# Patient Record
Sex: FEMALE | URBAN - METROPOLITAN AREA
[De-identification: names, ages, dates, MRNs, and addresses within clinical notes are randomized per-mention and may not be internally consistent; named-entity substitution may affect disease eponyms.]

---

## 2022-11-15 ENCOUNTER — HOSPITAL ENCOUNTER (OUTPATIENT)
Dept: PHYSICAL THERAPY | Age: 47
Discharge: HOME | End: 2022-11-15
Payer: OTHER GOVERNMENT

## 2022-11-15 PROCEDURE — 97140 MANUAL THERAPY 1/> REGIONS: CPT

## 2022-11-15 PROCEDURE — 97162 PT EVAL MOD COMPLEX 30 MIN: CPT

## 2022-11-15 PROCEDURE — 97110 THERAPEUTIC EXERCISES: CPT

## 2022-11-15 PROCEDURE — 97535 SELF CARE MNGMENT TRAINING: CPT
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
## 2022-11-21 ENCOUNTER — HOSPITAL ENCOUNTER (OUTPATIENT)
Dept: PHYSICAL THERAPY | Age: 47
Discharge: HOME | End: 2022-11-21
Payer: OTHER GOVERNMENT

## 2022-11-21 PROCEDURE — 97140 MANUAL THERAPY 1/> REGIONS: CPT

## 2022-11-21 PROCEDURE — 97110 THERAPEUTIC EXERCISES: CPT

## 2022-11-21 PROCEDURE — 97112 NEUROMUSCULAR REEDUCATION: CPT
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
## 2022-11-23 ENCOUNTER — HOSPITAL ENCOUNTER (OUTPATIENT)
Dept: PHYSICAL THERAPY | Age: 47
Discharge: HOME | End: 2022-11-23
Payer: OTHER GOVERNMENT

## 2022-11-23 PROCEDURE — 97140 MANUAL THERAPY 1/> REGIONS: CPT

## 2022-11-23 PROCEDURE — 97110 THERAPEUTIC EXERCISES: CPT

## 2022-11-23 PROCEDURE — 97112 NEUROMUSCULAR REEDUCATION: CPT

## 2022-11-23 NOTE — PROGRESS NOTES
PT DAILY TREATMENT NOTE     Patient Name: Genaro Ryan  Date:2022  : 1975  [x]  Patient  Verified  Payor:  / Plan: Kaylynn Mckeon / Product Type: Francie Enriquezhmann /    In time:1033  Out time:1125  Total Treatment Time (min): 52  Visit #: 3 of 12      Treatment Area: Cervicalgia [M54.2]    SUBJECTIVE  Pain Level (0-10 scale): 6  Any medication changes, allergies to medications, adverse drug reactions, diagnosis change, or new procedure performed?: [x] No    [] Yes (see summary sheet for update)  Subjective functional status/changes:   [] No changes reported  Patient denies atypical soreness post last session. OBJECTIVE    Modality rationale: decrease pain and increase tissue extensibility to improve the patients ability to tolerate ADLs.     Min Type Additional Details    [] Estim:  []Unatt       []IFC  []Premod                        []Other:  []w/ice   []w/heat  Position:  Location:    [] Estim: []Att    []TENS instruct  []NMES                    []Other:  []w/US   []w/ice   []w/heat  Position:  Location:    []  Traction: [] Cervical       []Lumbar                       [] Prone          []Supine                       []Intermittent   []Continuous Lbs:  [] before manual  [] after manual    []  Ultrasound: []Continuous   [] Pulsed                           []1MHz   []3MHz W/cm2:  Location:    []  Iontophoresis with dexamethasone         Location: [] Take home patch   [] In clinic   10 []  Ice     [x]  heat  []  Ice massage  []  Laser   []  Anodyne Position: supine   Location: neck    []  Laser with stim  []  Other:  Position:  Location:    []  Vasopneumatic Device    []  Right     []  Left  Pre-treatment girth:  Post-treatment girth:  Measured at (location):  Pressure:       [] lo [] med [] hi   Temperature: [] lo [] med [] hi   [x] Skin assessment post-treatment:  [x]intact [x]redness- no adverse reaction    []redness - adverse reaction:         11 min Therapeutic Exercise:  [x] See flow sheet :   Rationale: increase ROM and increase strength to improve the patients ability to complete ADLs with ease. 23 min Neuromuscular Re-education:  [x]  See flow sheet : cervical and scapular re-education, desensitization   Rationale: increase ROM, increase strength, improve coordination, and increase proprioception  to improve the patients ability to improve biomechanics and increase kinesthetic awareness for ease of ADL completion. 8 min Manual Therapy:  supine:  STM to B c/s paraspinals, UT/LS and scalenes, Gentle PA and right lateral mobs, SOR   The manual therapy interventions were performed at a separate and distinct time from the therapeutic activities interventions. Rationale: decrease pain, increase ROM, increase tissue extensibility, and decrease trigger points to improve functional mobility. With   [] TE   [] TA   [] neuro   [] other: Patient Education: [x] Review HEP    [] Progressed/Changed HEP based on:   [] positioning   [] body mechanics   [] transfers   [] heat/ice application    [] other:      Other Objective/Functional Measures:   -initiated rows/extensions, wall pushups   -patient required brief rest period during exercises due to reports of dizziness, after which she was able to complete remaining exercises. Pain Level (0-10 scale) post treatment: 4    ASSESSMENT/Changes in Function: Today's session focused on increased cervical and scapular strength, mobility, and kinesthetic awareness. Patient tolerated treatment well but required a brief rest period due to dizziness midway through session; however, she was able to complete remaining exercises. Increased TTP noted during manual, most notably at right cervical spine and left UT/LS. Continue to progress POC according to patient tolerance.      Patient will continue to benefit from skilled PT services to modify and progress therapeutic interventions, address functional mobility deficits, address ROM deficits, address strength deficits, analyze and address soft tissue restrictions, analyze and cue movement patterns, analyze and modify body mechanics/ergonomics, assess and modify postural abnormalities, and instruct in home and community integration to attain remaining goals. []  See Plan of Care  []  See progress note/recertification  []  See Discharge Summary         Progress towards goals / Updated goals:  Short Term Goals: To be accomplished in 2 weeks:  Pt will report daily HEP compliance to maximize therapeutic benefits. Eval: HEP assigned  Current: Pt report compliance, MET 11/21/2022  Pt will improve her B cervical sidebend to 25 deg to improve ease of self care. Eval: 15 deg with right sided neck pain  Long Term Goals: To be accomplished in 6 weeks:  Pt will improve her B c/s rotation to 65 deg to improve ease of turning to look behind her. Eval: 40 deg B with pain  Pt will improve her c/s flexion and extension to 40 deg or better to improve ease of looking down to the floor or up to the ceiling. Eval: 19 deg each with pain  Pt will report 75% improvement in headaches to improve ease of managing ADLs. Eval: daily unremitting MOLINA  Pt will demonstrate B shoulder strength of 4+/5 MMT without pain increase to improve ease of performing house chores. Eval: 4/5  Pt will improve her FOTO to 60, demonstrating improved functional ADL capacity.   Eval: 36       PLAN  []  Upgrade activities as tolerated     []  Continue plan of care  []  Update interventions per flow sheet       []  Discharge due to:_  []  Other:_      Nasir Huffman, PTA 11/23/2022  7:22 AM    Future Appointments   Date Time Provider Radha Vilchis   11/23/2022 10:45 AM Lucio Sanchez PTA [facility]   11/29/2022 11:00 AM [facility] NURSE [facility]   11/30/2022 12:15 PM Tanya Jeffers PTA [facility]   12/2/2022 10:45 AM Lucio Sanchez PTA [facility]   12/7/2022 12:00 [facility]   12/9/2022 10:30 AM [facility]   12/12/2022 10:30 AM [facility]

## 2022-11-29 ENCOUNTER — CLINICAL SUPPORT (OUTPATIENT)
Dept: GASTROENTEROLOGY | Age: 47
End: 2022-11-29

## 2022-11-29 DIAGNOSIS — Z12.11 ENCOUNTER FOR SCREENING COLONOSCOPY: Primary | ICD-10-CM

## 2022-11-29 RX ORDER — CONJUGATED ESTROGENS 0.62 MG/G
CREAM VAGINAL
COMMUNITY
Start: 2022-11-09 | End: 2023-11-09

## 2022-11-29 RX ORDER — IBUPROFEN 800 MG/1
TABLET ORAL
COMMUNITY
Start: 2022-10-26

## 2022-11-29 RX ORDER — MELOXICAM 7.5 MG/1
7.5 TABLET ORAL DAILY
COMMUNITY

## 2022-11-29 RX ORDER — POLYETHYLENE GLYCOL 3350, SODIUM SULFATE ANHYDROUS, SODIUM BICARBONATE, SODIUM CHLORIDE, POTASSIUM CHLORIDE 236; 22.74; 6.74; 5.86; 2.97 G/4L; G/4L; G/4L; G/4L; G/4L
4 POWDER, FOR SOLUTION ORAL
Qty: 4000 ML | Refills: 0 | Status: SHIPPED | OUTPATIENT
Start: 2022-11-29 | End: 2022-11-29

## 2022-11-30 ENCOUNTER — HOSPITAL ENCOUNTER (OUTPATIENT)
Dept: PHYSICAL THERAPY | Age: 47
Discharge: HOME | End: 2022-11-30
Payer: OTHER GOVERNMENT

## 2022-11-30 PROCEDURE — 97112 NEUROMUSCULAR REEDUCATION: CPT

## 2022-11-30 PROCEDURE — 97140 MANUAL THERAPY 1/> REGIONS: CPT

## 2022-11-30 PROCEDURE — 97110 THERAPEUTIC EXERCISES: CPT

## 2022-11-30 NOTE — PROGRESS NOTES
PT DAILY TREATMENT NOTE     Patient Name: Florencio Vale  Date:2022  : 1975  [x]  Patient  Verified  Payor:  / Plan: Stefan Holliday 75 / Product Type:  /    In time:12:05  Out time:1:03  Total Treatment Time (min): 58  Visit #: 4 of 12    Treatment Area: Cervicalgia [M54.2]    SUBJECTIVE  Pain Level (0-10 scale): 6  Any medication changes, allergies to medications, adverse drug reactions, diagnosis change, or new procedure performed?: [x] No    [] Yes (see summary sheet for update)  Subjective functional status/changes:   [] No changes reported  Pt states pain is on both sides of neck now.  She saw MD and has an appt to be screened for concussion on 22    OBJECTIVE    Modality rationale: decrease pain to improve the patients ability to perform daily tasks   Min Type Additional Details    [] Estim:  []Unatt       []IFC  []Premod                        []Other:  []w/ice   []w/heat  Position:  Location:    [] Estim: []Att    []TENS instruct  []NMES                    []Other:  []w/US   []w/ice   []w/heat  Position:  Location:    []  Traction: [] Cervical       []Lumbar                       [] Prone          []Supine                       []Intermittent   []Continuous Lbs:  [] before manual  [] after manual    []  Ultrasound: []Continuous   [] Pulsed                           []1MHz   []3MHz W/cm2:  Location:    []  Iontophoresis with dexamethasone         Location: [] Take home patch   [] In clinic   10 []  Ice     [x]  heat  []  Ice massage  []  Laser   []  Anodyne Position: supine with wedge  Location: neck    []  Laser with stim  []  Other:  Position:  Location:    []  Vasopneumatic Device    []  Right     []  Left  Pre-treatment girth:  Post-treatment girth:  Measured at (location):  Pressure:       [] lo [] med [] hi   Temperature: [] lo [] med [] hi   [] Skin assessment post-treatment:  []intact []redness- no adverse reaction    []redness - adverse reaction:     26 min Therapeutic Exercise:  [x] See flow sheet :   Rationale: increase ROM and increase strength to improve the patients ability to Perform ADLs    10 min Neuromuscular Re-education:  [x]  See flow sheet :   Rationale: increase strength, improve coordination, and increase proprioception  to improve the patients ability to perform functional tasks    12 min Manual Therapy: In supine, gentle manual c/s traction, SOR, STM to carlin c/s paraspinals, UT/LS and scalenes, Gentle PA and right lateral mobs   The manual therapy interventions were performed at a separate and distinct time from the therapeutic activities interventions. Rationale: decrease pain, increase ROM, and increase tissue extensibility to improve functional mobility            With   [] TE   [] TA   [] neuro   [] other: Patient Education: [x] Review HEP    [] Progressed/Changed HEP based on:   [] positioning   [] body mechanics   [] transfers   [] heat/ice application    [] other:      Other Objective/Functional Measures:   C/s SB to right 30*, to left 38*     Pain Level (0-10 scale) post treatment: 4    ASSESSMENT/Changes in Function: Pt demo's improved carlin c/s SB and has met STG#2. Cont's to report ms tension and frequent HA. Cont'd significant ms tension @ carlin LS and right scalenes. Good tolerance to SNAGs with towel and no c/o dizziness during treatment today. Patient will continue to benefit from skilled PT services to modify and progress therapeutic interventions, address functional mobility deficits, address ROM deficits, address strength deficits, analyze and address soft tissue restrictions, analyze and cue movement patterns, analyze and modify body mechanics/ergonomics, and assess and modify postural abnormalities to attain remaining goals. []  See Plan of Care  []  See progress note/recertification  []  See Discharge Summary         Progress towards goals / Updated goals:  Short Term Goals:  To be accomplished in 2 weeks:  Pt will report daily HEP compliance to maximize therapeutic benefits. Eval: HEP assigned  Current: Pt report compliance, MET 11/21/2022  Pt will improve her B cervical sidebend to 25 deg to improve ease of self care. Eval: 15 deg with right sided neck pain  Current:Met, C/s SB to right 30*, to left 38* 11/30/2022  Long Term Goals: To be accomplished in 6 weeks:  Pt will improve her B c/s rotation to 65 deg to improve ease of turning to look behind her. Eval: 40 deg B with pain  Pt will improve her c/s flexion and extension to 40 deg or better to improve ease of looking down to the floor or up to the ceiling. Eval: 19 deg each with pain  Pt will report 75% improvement in headaches to improve ease of managing ADLs. Eval: daily unremitting MOLINA  Pt will demonstrate B shoulder strength of 4+/5 MMT without pain increase to improve ease of performing house chores. Eval: 4/5  Pt will improve her FOTO to 60, demonstrating improved functional ADL capacity.   Eval: 36    PLAN  []  Upgrade activities as tolerated     [x]  Continue plan of care  []  Update interventions per flow sheet       []  Discharge due to:_  []  Other:_      Michael Jeffers, PTA 11/30/2022  12:04 PM    Future Appointments   Date Time Provider Radha Vilchis   11/30/2022 12:15 PM [facility]   12/2/2022 10:45 AM [facility] Feliz[facility]   12/9/2022 10:30 AM [facility]   12/12/2022 10:30 AM Ann Cerna, PT [facility]

## 2022-12-01 ENCOUNTER — PREP FOR PROCEDURE (OUTPATIENT)
Dept: GASTROENTEROLOGY | Age: 47
End: 2022-12-01

## 2022-12-01 RX ORDER — SODIUM CHLORIDE, SODIUM LACTATE, POTASSIUM CHLORIDE, CALCIUM CHLORIDE 600; 310; 30; 20 MG/100ML; MG/100ML; MG/100ML; MG/100ML
75 INJECTION, SOLUTION INTRAVENOUS CONTINUOUS
OUTPATIENT
Start: 2022-12-01 | End: 2022-12-01

## 2022-12-01 NOTE — H&P
Open access updated H&P. 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 who was referred for screening colonoscopy. Review of the records showed that they had few if any health problems, excessive obesity, or significant medications that would require office evaluation prior to colonoscopy for colon cancer screening. After review of their chart, contact was made with the patient in discussion and literature sent regarding the procedure and the bowel preparation. They are here now for their procedure. Past medical and surgical history: No past medical history on file. No past surgical history on file. Allergies:  No Known Allergies     Medications:  No current facility-administered medications for this encounter. Current Outpatient Medications:     ibuprofen (MOTRIN) 800 mg tablet, , Disp: , Rfl:     conjugated estrogens (Premarin) 0.625 mg/gram vaginal cream, Use 3 times a week, Disp: , Rfl:     meloxicam (Mobic) 7.5 mg tablet, Take 7.5 mg by mouth daily. , Disp: , Rfl:     cyclobenzaprine HCl (FLEXERIL PO), Take  by mouth., Disp: , Rfl:      Family history:  The patient has a family history of no known GI disease. Social history :    Social Connections: Not on file        ROS:   Review of Systems - negative     Vital signs: There were no vitals taken for this visit. PE: Healthy appearing female  HEENT: Normocephalic atraumatic. No oral abnormalities. Lungs: Clear to auscultation percussion. Heart: Regular rate and rhythm without murmur rub or gallop. Abdomen: Normal bowel sounds, soft nontender without hepatosplenomegaly or masses. Extremities: No peripheral edema. Neuro: No distinct abnormalities. Impression:  1. Colon cancer screening. The patient is a healthy female that is stable and here for colon cancer screening via colonoscopy. Recommendations:  1. Colonoscopy with MAC.   The risks, benefits, adverse reactions including death and the possibility of missed lesions were neoplasia were discussed in detail today with the patient prior to the procedure. They understand and agreed to undergo the procedure. 2.  Further recommendations pending their clinical course. 3.  Followup as needed.

## 2022-12-02 ENCOUNTER — HOSPITAL ENCOUNTER (OUTPATIENT)
Dept: PHYSICAL THERAPY | Age: 47
Discharge: HOME | End: 2022-12-02
Payer: OTHER GOVERNMENT

## 2022-12-02 PROCEDURE — 97112 NEUROMUSCULAR REEDUCATION: CPT

## 2022-12-02 PROCEDURE — 97140 MANUAL THERAPY 1/> REGIONS: CPT

## 2022-12-02 PROCEDURE — 97535 SELF CARE MNGMENT TRAINING: CPT

## 2022-12-02 PROCEDURE — 97110 THERAPEUTIC EXERCISES: CPT

## 2022-12-02 NOTE — PROGRESS NOTES
PT DAILY TREATMENT NOTE     Patient Name: Mallory Rosenbaum  Date:2022  : 1975  [x]  Patient  Verified  Payor: LATASHA / Plan: Stefan Holliday 75 / Product Type:  /    In time:1049  Out time:1143  Total Treatment Time (min): 54  Visit #: 5 of 12      Treatment Area: Cervicalgia [M54.2]    SUBJECTIVE  Pain Level (0-10 scale): 6  Any medication changes, allergies to medications, adverse drug reactions, diagnosis change, or new procedure performed?: [x] No    [] Yes (see summary sheet for update)  Subjective functional status/changes:   [] No changes reported  Patient reports that while the manual at last session was uncomfortable, she felt relief afterwards that lasted until last night. Patient reports that she has been self-massaging her jaw constantly due to pain. She has a referral for a TMJ evaluation. OBJECTIVE    Modality rationale: decrease pain and increase tissue extensibility to improve the patients ability to tolerate ADLs.     Min Type Additional Details    [] Estim:  []Unatt       []IFC  []Premod                        []Other:  []w/ice   []w/heat  Position:  Location:    [] Estim: []Att    []TENS instruct  []NMES                    []Other:  []w/US   []w/ice   []w/heat  Position:  Location:    []  Traction: [] Cervical       []Lumbar                       [] Prone          []Supine                       []Intermittent   []Continuous Lbs:  [] before manual  [] after manual    []  Ultrasound: []Continuous   [] Pulsed                           []1MHz   []3MHz W/cm2:  Location:    []  Iontophoresis with dexamethasone         Location: [] Take home patch   [] In clinic   10 []  Ice     [x]  heat  []  Ice massage  []  Laser   []  Anodyne Position: supine   Location: neck    []  Laser with stim  []  Other:  Position:  Location:    []  Vasopneumatic Device    []  Right     []  Left  Pre-treatment girth:  Post-treatment girth:  Measured at (location):  Pressure:       [] lo [] med [] hi   Temperature: [] lo [] med [] hi   [x] Skin assessment post-treatment:  [x]intact [x]redness- no adverse reaction    []redness - adverse reaction:       14 min Therapeutic Exercise:  [x] See flow sheet :   Rationale: increase ROM and increase strength to improve the patients ability to complete ADLs with ease. 14 min Neuromuscular Re-education:  [x]  See flow sheet : cervical and scapular re-education, including VC to engage appropriate musculature and improve neuromuscular sequencing   Rationale: increase ROM, increase strength, improve coordination, improve balance, and increase proprioception  to improve the patients ability to improve biomechanics and increase kinesthetic awareness for ease of ADL completion. 8 min Self Care/Home Management: patient education on importance of posture for decreased forward head posture, body ergonomics and computer monitor setup/while driving/throughout daily activities, working within pain-free ranges of motion. Rationale:  Patient education   to improve the patients ability to improve PT exercise carryover. 8 min Manual Therapy:  STM/TPR to B C/S paraspinals, mastoid process, UT/LS, and scalenes with active myofascial release, gentle right lateral mobs, SOR, all with patient supine. The manual therapy interventions were performed at a separate and distinct time from the therapeutic activities interventions. Rationale: decrease pain, increase ROM, increase tissue extensibility, and decrease trigger points to improve functional mobility. With   [] TE   [] TA   [] neuro   [] other: Patient Education: [x] Review HEP    [] Progressed/Changed HEP based on:   [] positioning   [] body mechanics   [] transfers   [] heat/ice application    [] other:      Other Objective/Functional Measures:   -towel roll used during half foam thoracic extensions to prevent excessive cervical extension; patient reported improved comfort using this method. -increased reps during cervical retractions and wall pushups    Pain Level (0-10 scale) post treatment: 3    ASSESSMENT/Changes in Function: Heavy patient education provided regarding appropriate postural alignment for decreased forward head posture and improved spinal alignment throughout the day, while driving, and while working. Patient verbalized understanding; however, carryover while completing remaining exercises was fair. Patient was able to complete thoracic extension stretch with use of towel roll to prevent excessive cervical extension. Patient will continue to benefit from skilled PT services to modify and progress therapeutic interventions, address functional mobility deficits, address ROM deficits, address strength deficits, analyze and address soft tissue restrictions, analyze and cue movement patterns, analyze and modify body mechanics/ergonomics, assess and modify postural abnormalities, and instruct in home and community integration to attain remaining goals. []  See Plan of Care  []  See progress note/recertification  []  See Discharge Summary         Progress towards goals / Updated goals:  Short Term Goals: To be accomplished in 2 weeks:  Pt will report daily HEP compliance to maximize therapeutic benefits. Eval: HEP assigned  Current: Pt report compliance, MET 11/21/2022  Pt will improve her B cervical sidebend to 25 deg to improve ease of self care. Eval: 15 deg with right sided neck pain  Current:Met, C/s SB to right 30*, to left 38* 11/30/2022  Long Term Goals: To be accomplished in 6 weeks:  Pt will improve her B c/s rotation to 65 deg to improve ease of turning to look behind her. Eval: 40 deg B with pain  Current: right = 42 degrees, left = 48 degrees 12/2/22  Pt will improve her c/s flexion and extension to 40 deg or better to improve ease of looking down to the floor or up to the ceiling.   Eval: 19 deg each with pain  Current: Flexion = 24 degrees, extension = 19 degrees 12/2/22  Pt will report 75% improvement in headaches to improve ease of managing ADLs. Eval: daily unremitting MOLINA    Pt will demonstrate B shoulder strength of 4+/5 MMT without pain increase to improve ease of performing house chores. Eval: 4/5    Pt will improve her FOTO to 60, demonstrating improved functional ADL capacity.   Eval: 39    PLAN  [x]  Upgrade activities as tolerated     []  Continue plan of care  []  Update interventions per flow sheet       []  Discharge due to:_  []  Other:_      Fabiana Hood PTA 12/2/2022  6:51 AM    Future Appointments   Date Time Provider Radha Vilchis   12/2/2022 10:45 AM Princess Jefferson [facility]   12/9/2022 10:30 AM [facility]resp[facility], [facility]   12/12/2022 10:30 AM Thomas Crespo, [facility]ALINA [facility]

## 2022-12-07 ENCOUNTER — HOSPITAL ENCOUNTER (OUTPATIENT)
Age: 47
Setting detail: OUTPATIENT SURGERY
Discharge: HOME | End: 2022-12-07
Attending: INTERNAL MEDICINE | Admitting: INTERNAL MEDICINE
Payer: OTHER GOVERNMENT

## 2022-12-07 ENCOUNTER — ANESTHESIA EVENT (OUTPATIENT)
Dept: ENDOSCOPY | Age: 47
End: 2022-12-07
Payer: OTHER GOVERNMENT

## 2022-12-07 ENCOUNTER — ANESTHESIA (OUTPATIENT)
Dept: ENDOSCOPY | Age: 47
End: 2022-12-07
Payer: OTHER GOVERNMENT

## 2022-12-07 ENCOUNTER — APPOINTMENT (OUTPATIENT)
Dept: PHYSICAL THERAPY | Age: 47
End: 2022-12-07
Payer: OTHER GOVERNMENT

## 2022-12-07 VITALS
BODY MASS INDEX: 28.68 KG/M2 | DIASTOLIC BLOOD PRESSURE: 44 MMHG | SYSTOLIC BLOOD PRESSURE: 104 MMHG | RESPIRATION RATE: 16 BRPM | HEART RATE: 70 BPM | WEIGHT: 168 LBS | HEIGHT: 64 IN | TEMPERATURE: 97.6 F | OXYGEN SATURATION: 98 %

## 2022-12-07 LAB — HCG UR QL: NEGATIVE

## 2022-12-07 PROCEDURE — 74011250636 HC RX REV CODE- 250/636: Performed by: NURSE ANESTHETIST, CERTIFIED REGISTERED

## 2022-12-07 PROCEDURE — 76060000032 HC ANESTHESIA 0.5 TO 1 HR: Performed by: INTERNAL MEDICINE

## 2022-12-07 PROCEDURE — 76040000007: Performed by: INTERNAL MEDICINE

## 2022-12-07 PROCEDURE — 81025 URINE PREGNANCY TEST: CPT

## 2022-12-07 PROCEDURE — 2709999900 HC NON-CHARGEABLE SUPPLY: Performed by: INTERNAL MEDICINE

## 2022-12-07 PROCEDURE — 77030042138 HC TBNG SPECIAL -A: Performed by: INTERNAL MEDICINE

## 2022-12-07 PROCEDURE — 77030037186 HC VLV ENDOSC STRL DEFENDO DISP MVAT -A: Performed by: INTERNAL MEDICINE

## 2022-12-07 PROCEDURE — 77030018831 HC SOL IRR H20 BAXT -A: Performed by: INTERNAL MEDICINE

## 2022-12-07 PROCEDURE — 45378 DIAGNOSTIC COLONOSCOPY: CPT | Performed by: INTERNAL MEDICINE

## 2022-12-07 RX ORDER — INSULIN LISPRO 100 [IU]/ML
INJECTION, SOLUTION INTRAVENOUS; SUBCUTANEOUS ONCE
Status: CANCELLED | OUTPATIENT
Start: 2022-12-07 | End: 2022-12-08

## 2022-12-07 RX ORDER — SODIUM CHLORIDE, SODIUM LACTATE, POTASSIUM CHLORIDE, CALCIUM CHLORIDE 600; 310; 30; 20 MG/100ML; MG/100ML; MG/100ML; MG/100ML
25 INJECTION, SOLUTION INTRAVENOUS CONTINUOUS
Status: DISCONTINUED | OUTPATIENT
Start: 2022-12-07 | End: 2022-12-07 | Stop reason: HOSPADM

## 2022-12-07 RX ORDER — PROPOFOL 10 MG/ML
INJECTION, EMULSION INTRAVENOUS AS NEEDED
Status: DISCONTINUED | OUTPATIENT
Start: 2022-12-07 | End: 2022-12-07 | Stop reason: HOSPADM

## 2022-12-07 RX ORDER — SODIUM CHLORIDE 0.9 % (FLUSH) 0.9 %
5-40 SYRINGE (ML) INJECTION AS NEEDED
Status: CANCELLED | OUTPATIENT
Start: 2022-12-07

## 2022-12-07 RX ORDER — SODIUM CHLORIDE 0.9 % (FLUSH) 0.9 %
5-40 SYRINGE (ML) INJECTION EVERY 8 HOURS
Status: CANCELLED | OUTPATIENT
Start: 2022-12-07

## 2022-12-07 RX ADMIN — PROPOFOL 200 MG: 10 INJECTION, EMULSION INTRAVENOUS at 14:55

## 2022-12-07 NOTE — PROCEDURES
Colonoscopy procedure note    Date of service: 12/07/22     Type: Screening    Indication for procedure: Colon cancer screening    Anesthesia classification: ASA class 2    Patient history and physical been accomplished and documented. Patient is assessed and determined to be appropriate candidate for planned procedure and sedation; patient reassessed immediately prior to sedation. Sedation plan: MAC per anesthesia    Surgical assistant: Not applicable    Airway assessment: Range of motion: Normal, mouth opening, Visual obstruction: No.    UPDATED PREOP EXAM:  Unchanged. VS: Reviewed  Gen: in NAD  CV: RRR, no murmur  Resp: CTA  Abd: Soft, NTND, +BS  Extrem: No cyanosis or edema  Neuro: Awake and alert    Informed consent obtained: Yes. The indications, risks including but not limited to bleeding, perforation, infection, death, and potential failure to visual areas are diagnosed neoplasia, alternatives and benefits were discussed with the patient prior to the procedure. Patient identity and procedure was verified, absent was obtained, and is consistent with the consent form found in the patient's records. PROCEDURE PERFORMED:  COLONOSCOPY  to the cecum with MAC     INSTRUMENT: Olympus colonoscope per nursing notes. FINDINGS:    External anal lesions: Normal   Rectum: normal.   Retroflexion view: Normal  Sigmoid: normal   Descending Colon: normal   Transverse Colon: normal   Ascending Colon: normal   Cecum: normal   Terminal ileum: not evaluated     Specimens: none     Bowel preparation- adequate to detect small (5mm) polyps or larger. Estimated blood loss: none   Complications:  none   Cecal withdrawal time: 6 minutes. Comments:  none     Impression:  Normal colonoscopy to the cecum. No polyps or masses were seen. Recommendations:  Repeat colonoscopy in  10 years for screening. Follow up as needed.

## 2022-12-07 NOTE — INTERVAL H&P NOTE
Update History & Physical    The Patient's History and Physical of December 7, 2022  The procedure was reviewed with the patient and I examined the patient. There was no change. The surgical site was confirmed by the patient and me. Plan:  The risk, benefits, expected outcome, and alternative to the recommended procedure have been discussed with the patient. Patient understands and wants to proceed with the procedure.     Electronically signed by Kevin Aldrich MD on 12/7/2022 at 2:13 PM

## 2022-12-07 NOTE — ANESTHESIA POSTPROCEDURE EVALUATION
Procedure(s):  COLONOSCOPY. MAC    Anesthesia Post Evaluation      Multimodal analgesia: multimodal analgesia used between 6 hours prior to anesthesia start to PACU discharge  Patient location during evaluation: bedside  Patient participation: complete - patient participated  Level of consciousness: awake and responsive to physical stimuli  Pain management: satisfactory to patient  Airway patency: patent  Anesthetic complications: no  Cardiovascular status: acceptable  Respiratory status: acceptable  Hydration status: acceptable  Comments: Patient is awake and comfortable post operatively. Report to RN at bedside.   Post anesthesia nausea and vomiting:  none  Final Post Anesthesia Temperature Assessment:  Normothermia (36.0-37.5 degrees C)      INITIAL Post-op Vital signs:   Vitals Value Taken Time   BP 92/50 12/07/22 1515   Temp 36.4 °C (97.5 °F) 12/07/22 1515   Pulse 62 12/07/22 1515   Resp 16 12/07/22 1515   SpO2 99 % 12/07/22 1515

## 2022-12-09 ENCOUNTER — HOSPITAL ENCOUNTER (OUTPATIENT)
Dept: PHYSICAL THERAPY | Age: 47
Discharge: HOME | End: 2022-12-09
Payer: OTHER GOVERNMENT

## 2022-12-09 PROCEDURE — 97014 ELECTRIC STIMULATION THERAPY: CPT

## 2022-12-09 PROCEDURE — 97112 NEUROMUSCULAR REEDUCATION: CPT

## 2022-12-09 PROCEDURE — 97140 MANUAL THERAPY 1/> REGIONS: CPT

## 2022-12-09 PROCEDURE — 97110 THERAPEUTIC EXERCISES: CPT

## 2022-12-09 NOTE — PROGRESS NOTES
PT DAILY TREATMENT NOTE 10-18    Patient Name: Jeremy Martinez  Date:2022  : 1975  [x]  Patient  Verified  Payor:  / Plan: Stefan Holliday 75 / Product Type:  /    In time:1014  Out time:1117  Total Treatment Time (min): 64  Visit #: 6 of 12    Treatment Area: Cervicalgia [M54.2]    SUBJECTIVE  Pain Level (0-10 scale): 6  Any medication changes, allergies to medications, adverse drug reactions, diagnosis change, or new procedure performed?: [x] No    [] Yes (see summary sheet for update)  Subjective functional status/changes:   [] No changes reported  CC remains right sided C/S and right upper quadrant pain. OBJECTIVE  Modality rationale: decrease pain to improve the patients ability to tolerate post exercise soreness   Min Type Additional Details   10 []  Ice     [x]  heat  []  Ice massage  []  Laser   []  Anodyne Position:supine  Location:C/S   [] Skin assessment post-treatment:  []intact []redness- no adverse reaction        30 min Therapeutic Exercise:  [x] See flow sheet :   Rationale: increase ROM and increase strength to improve the patients ability to perform daily activities      14 min Neuromuscular Re-education:  [x]  See flow sheet :   Rationale: increase ROM and increase strength  to improve the patients ability to perform daily activities     10 min Manual Therapy:  STM/TPR to B C/S paraspinals, mastoid process, UT/LS, and scalenes with active myofascial release, gentle right lateral mobs, SOR, all with patient supine. The manual therapy interventions were performed at a separate and distinct time from the therapeutic activities interventions.   Rationale: decrease pain, increase ROM, increase tissue extensibility, and decrease trigger points to relax mm for daily activities   With   [] TE   [] TA   [] neuro   [] other: Patient Education: [x] Review HEP    [] Progressed/Changed HEP based on:   [] positioning   [] body mechanics   [] transfers   [] heat/ice application    [] other:      Other Objective/Functional Measures:      Pain Level (0-10 scale) post treatment: 0    ASSESSMENT/Changes in Function: Requires cueing to relax SCMs with most exercises as patient has tendency to guard and recruit SCMs versus deep cervical flexors. Added estim today to help reduce pain, as pain ratings have no changed. Patient had extremely good response to estim , as she reported \"no pain\" and reported she \"felt amazing\". Patient will continue to benefit from skilled PT services to modify and progress therapeutic interventions, address functional mobility deficits, address ROM deficits, address strength deficits, analyze and address soft tissue restrictions, analyze and cue movement patterns, and assess and modify postural abnormalities to attain remaining goals. []  See Plan of Care  []  See progress note/recertification  []  See Discharge Summary         Progress towards goals / Updated goals:  Short Term Goals: To be accomplished in 2 weeks:  Pt will report daily HEP compliance to maximize therapeutic benefits. Eval: HEP assigned  Current: Pt report compliance, MET 11/21/2022  Pt will improve her B cervical sidebend to 25 deg to improve ease of self care. Eval: 15 deg with right sided neck pain  Current:Met, C/s SB to right 30*, to left 38* 11/30/2022  Long Term Goals: To be accomplished in 6 weeks:  Pt will improve her B c/s rotation to 65 deg to improve ease of turning to look behind her. Eval: 40 deg B with pain  Current: right = 42 degrees, left = 48 degrees 12/2/22  Pt will improve her c/s flexion and extension to 40 deg or better to improve ease of looking down to the floor or up to the ceiling. Eval: 19 deg each with pain  Current: Flexion = 24 degrees, extension = 19 degrees 12/2/22  Pt will report 75% improvement in headaches to improve ease of managing ADLs.   Eval: daily unremitting MOLINA     Pt will demonstrate B shoulder strength of 4+/5 MMT without pain increase to improve ease of performing house chores. Eval: 4/5     Pt will improve her FOTO to 60, demonstrating improved functional ADL capacity.   Eval: 36    PLAN  [x]  Upgrade activities as tolerated     []  Continue plan of care  []  Update interventions per flow sheet       []  Discharge due to:_  []  Other:_      Sharlene Juárez, HAILY, CMTPT 12/9/2022  9:42 AM    Future Appointments   Date Time Provider Radha Vilchis   12/9/2022 10:30 AM [facility]   12/12/2022 10:30 AM Edison [facility]

## 2022-12-12 ENCOUNTER — HOSPITAL ENCOUNTER (OUTPATIENT)
Dept: PHYSICAL THERAPY | Age: 47
Discharge: HOME | End: 2022-12-12
Payer: OTHER GOVERNMENT

## 2022-12-12 PROCEDURE — 97112 NEUROMUSCULAR REEDUCATION: CPT

## 2022-12-12 PROCEDURE — 97110 THERAPEUTIC EXERCISES: CPT

## 2022-12-12 PROCEDURE — 97014 ELECTRIC STIMULATION THERAPY: CPT

## 2022-12-12 PROCEDURE — 97140 MANUAL THERAPY 1/> REGIONS: CPT
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
## 2022-12-12 NOTE — PROGRESS NOTES
PT DAILY TREATMENT NOTE 10    Patient Name: Miguelangel Sevilla  Date:2022  : 1975  [x]  Patient  Verified  Payor:  / Plan: Stefan Holliday 75 / Product Type:  /    In time:1028  Out time:1128  Total Treatment Time (min): 60  Visit #: 7 of 12        Treatment Area: Cervicalgia [M54.2]    SUBJECTIVE  Pain Level (0-10 scale): 6  Any medication changes, allergies to medications, adverse drug reactions, diagnosis change, or new procedure performed?: [x] No    [] Yes (see summary sheet for update)  Subjective functional status/changes:   [] No changes reported      OBJECTIVE  Modality rationale: decrease pain and increase tissue extensibility to improve the patients ability to perform daily activities    Min Type Additional Details   10 [x] Estim:  [x]Unatt       []IFC  [x]Premod                        []Other:  []w/ice   [x]w/heat  Position:supine   Location:B C/s and UT   [] Skin assessment post-treatment:  []intact []redness- no adverse reaction    []redness - adverse reaction:         28 min Therapeutic Exercise:  [x] See flow sheet :   Rationale: increase ROM and increase strength to improve the patients ability to perform daily activities      14 min Neuromuscular Re-education:  [x]  See flow sheet :   Rationale: increase ROM and increase strength  to improve the patients ability to perform overhead reaching without pain    8 min Manual Therapy:   STM/TPR to B C/S paraspinals, mastoid process, UT/LS, and scalenes with active myofascial release, gentle right lateral mobs, SOR, all with patient supine. The manual therapy interventions were performed at a separate and distinct time from the therapeutic activities interventions.   Rationale: decrease pain, increase ROM, and increase tissue extensibility to relax mm for daily activities   With   [] TE   [] TA   [] neuro   [] other: Patient Education: [x] Review HEP    [] Progressed/Changed HEP based on:   [] positioning   [] body mechanics   [] transfers   [] heat/ice application    [] other:      Other Objective/Functional Measures: see below     Pain Level (0-10 scale) post treatment: 5    ASSESSMENT/Changes in Function: Patient's ROM and strength are slowly improving; minimal improvement with subjective pain levels. Frequency of HAs has improved as well, as patient was getting daily HAs; now she reports 25% decrease in HAs. Patient will benefit from continuing with PT to further progress towards goals. Patient will continue to benefit from skilled PT services to modify and progress therapeutic interventions, address functional mobility deficits, address ROM deficits, address strength deficits, analyze and address soft tissue restrictions, and analyze and cue movement patterns to attain remaining goals. []  See Plan of Care  [x]  See progress note/recertification  []  See Discharge Summary         Progress towards goals / Updated goals:  Short Term Goals: To be accomplished in 2 weeks:  Pt will report daily HEP compliance to maximize therapeutic benefits. Eval: HEP assigned  Current: Pt report compliance, MET 11/21/2022  Pt will improve her B cervical sidebend to 25 deg to improve ease of self care. Eval: 15 deg with right sided neck pain  Current:Met, C/s SB to right 30*, to left 38* 11/30/2022  Long Term Goals: To be accomplished in 6 weeks:  Pt will improve her B c/s rotation to 65 deg to improve ease of turning to look behind her. Eval: 40 deg B with pain  Current: right = 42 degrees, left = 48 degrees 12/2/22  Pt will improve her c/s flexion and extension to 40 deg or better to improve ease of looking down to the floor or up to the ceiling. Eval: 19 deg each with pain  Current: Flexion = 24 degrees, extension = 19 degrees 12/2/22  Pt will report 75% improvement in headaches to improve ease of managing ADLs.   Eval: daily unremitting HA  Current:every other day 25% improvement      Pt will demonstrate B shoulder strength of 4+/5 MMT without pain increase to improve ease of performing house chores. Eval: 4/5  Current: 4+/5 goal met      Pt will improve her FOTO to 60, demonstrating improved functional ADL capacity.   Eval: 36    Current:37 1 point gain    PLAN  [x]  Upgrade activities as tolerated     []  Continue plan of care  []  Update interventions per flow sheet       []  Discharge due to:_  []  Other:_      Quinten Newman, MPT, CMTPT 12/12/2022  8:44 AM    Future Appointments   Date Time Provider Radha Mame   12/12/2022 10:30 AM Ana Reeves B, PT [facility]   12/16/2022 11:30 AM [place] Srinivasa, PTA [facility]   12/21/2022 10:00 AM Humza Ruth, PT [facility]   12/23/2022  1:00 PM Mercedez Rangel, PT [facility]   12/27/2022  4:00 PM David Srinivasa, PTA [facility]   12/30/2022  9:30 AM David Srinivasa, PTA [facility]   1/4/2023 10:30 AM Mercedez Mitchellatfatmata, PT [facility]   1/6/2023 10:00 AM Laurena Ruth, PT [facility]

## 2022-12-16 ENCOUNTER — HOSPITAL ENCOUNTER (OUTPATIENT)
Dept: PHYSICAL THERAPY | Age: 47
Discharge: HOME | End: 2022-12-16
Payer: OTHER GOVERNMENT

## 2022-12-16 PROCEDURE — 97140 MANUAL THERAPY 1/> REGIONS: CPT

## 2022-12-16 PROCEDURE — 97112 NEUROMUSCULAR REEDUCATION: CPT

## 2022-12-16 PROCEDURE — 97110 THERAPEUTIC EXERCISES: CPT

## 2022-12-16 PROCEDURE — 97014 ELECTRIC STIMULATION THERAPY: CPT

## 2022-12-16 NOTE — PROGRESS NOTES
PT DAILY TREATMENT NOTE     Patient Name: Maru Iglesias  Date:2022  : 1975  [x]  Patient  Verified  Payor: LATASHA / Plan: Stefan Holliday 74 / Product Type:  /    In time:11:32  Out time:12:30   Total Treatment Time (min): 62  Visit #: 1 of 8    Treatment Area: Cervicalgia [M54.2]    SUBJECTIVE  Pain Level (0-10 scale): 6  Any medication changes, allergies to medications, adverse drug reactions, diagnosis change, or new procedure performed?: [x] No    [] Yes (see summary sheet for update)  Subjective functional status/changes:   [] No changes reported  No headache today. OBJECTIVE    Modality rationale: decrease pain and increase muscle contraction/control to improve the patients ability to manage post exercise soreness. Min Type Additional Details   10 [x] Estim:  [x]Unatt       []IFC  []Premod                        []Other:  []w/ice   [x]w/heat  Position:supine  Location:B C/S and UT       26 min Therapeutic Exercise:  [x] See flow sheet :   Rationale: increase ROM and increase strength to improve the patients ability to perform daily tasks. 14 min Neuromuscular Re-education:  [x]  See flow sheet :   Rationale: increase strength, improve coordination, and increase proprioception  to improve the patients ability to perform ADLs with reduced pain. 8 min Manual Therapy:    Supine - STM/TPR to B C/S paraspinals, UT/LS, and scalenes, gentle right lateral mobilizations, SOR   The manual therapy interventions were performed at a separate and distinct time from the therapeutic activities interventions. Rationale: decrease pain, increase ROM, and increase tissue extensibility to navigate environment with improved efficiency.           With   [] TE   [] TA   [] neuro   [] other: Patient Education: [x] Review HEP    [] Progressed/Changed HEP based on:   [] positioning   [] body mechanics   [] transfers   [] heat/ice application    [] other:      Other Objective/Functional Measures:   Guarding behaviors observed throughout session     Pain Level (0-10 scale) post treatment: 4    ASSESSMENT/Changes in Function: Patient requiring full trunk rotation to navigate/scan environment. Tolerated interventions with mild increase in discomfort. Reporting no headache today. Patient will continue to benefit from skilled PT services to modify and progress therapeutic interventions, address functional mobility deficits, address ROM deficits, address strength deficits, analyze and address soft tissue restrictions, analyze and cue movement patterns, and analyze and modify body mechanics/ergonomics to attain remaining goals. Progress towards goals / Updated goals:  Long Term Goals: To be accomplished in 6 weeks:  Pt will improve her B c/s rotation to 65 deg to improve ease of turning to look behind her. PN: right = 42 degrees, left = 48 degrees   Pt will improve her c/s flexion and extension to 40 deg or better to improve ease of looking down to the floor or up to the ceiling. PN: Flexion = 24 degrees, extension = 19 degrees  Pt will report 75% improvement in headaches to improve ease of managing ADLs. PN:every other day 25% improvement  4. Pt will improve her FOTO to 60, demonstrating improved functional ADL capacity.   PN:37 1 point gain     PLAN  [x]  Upgrade activities as tolerated     []  Continue plan of care  []  Update interventions per flow sheet       []  Discharge due to:_  []  Other:_      Noé Calixto PT 12/16/2022  11:48 AM    Future Appointments   Date Time Provider Radha Vilchis   12/21/2022 10:00 AM Maximus Carter, PT [facility]   12/30/2022  9:30 AM Nivia Ruth PTA [facility]   1/4/2023 10:30 AM Edison Zhang, PT [facility]   1/6/2023 10:00 AM Maximus Carter PT [facility]   1/9/2023 10:30 AM Edison Zhang, PT [facility]   1/12/2023 10:00 AM Edison Zhang, PT [facility]

## 2022-12-21 ENCOUNTER — HOSPITAL ENCOUNTER (OUTPATIENT)
Dept: PHYSICAL THERAPY | Age: 47
Discharge: HOME | End: 2022-12-21
Payer: OTHER GOVERNMENT

## 2022-12-21 PROCEDURE — 97014 ELECTRIC STIMULATION THERAPY: CPT

## 2022-12-21 PROCEDURE — 97140 MANUAL THERAPY 1/> REGIONS: CPT

## 2022-12-21 PROCEDURE — 97112 NEUROMUSCULAR REEDUCATION: CPT

## 2022-12-21 PROCEDURE — 97110 THERAPEUTIC EXERCISES: CPT

## 2022-12-21 NOTE — PROGRESS NOTES
PT DAILY TREATMENT NOTE     Patient Name: Jonah Holstein  Date:2022  : 1975  [x]  Patient  Verified  Payor: LATASHA / Plan: Stefan Holliday 74 / Product Type:  /    In time:952am  Out time:11am  Total Treatment Time (min): 68  Visit #: 2 of 8     Treatment Area: Cervicalgia [M54.2]    SUBJECTIVE  Pain Level (0-10 scale): 5  Any medication changes, allergies to medications, adverse drug reactions, diagnosis change, or new procedure performed?: [x] No    [] Yes (see summary sheet for update)  Subjective functional status/changes:   [] No changes reported  Pressure on the right side of her neck will shoot pain to her temple and cheek. Feels increased pain in right shoulder. Feels like her arms are getting stronger. Had a HA yesterday but not currently. OBJECTIVE    Modality rationale: decrease pain, increase tissue extensibility, and increase muscle contraction/control to improve the patients ability to manage self care. Min Type Additional Details   15 [x] Estim:  [x]Unatt       []IFC  []Premod                        [x]Other: HiVolt []w/ice   [x]w/heat  Position:seated  Location: right c/s and UT   [] Skin assessment post-treatment:  []intact []redness- no adverse reaction    []redness - adverse reaction:   15 min Therapeutic Exercise:  [x] See flow sheet :   Rationale: increase ROM and increase strength to improve the patients ability to perform daily tasks. 23 min Neuromuscular Re-education:  [x]  See flow sheet :   Rationale: increase strength, improve coordination, and increase proprioception  to improve the patients ability to perform ADLs with reduced pain. 15 min Manual Therapy:    Supine - STM/TPR to right  C/S paraspinals, UT/LS, and scalenes, gentle right lateral mobilizations, SOR    The manual therapy interventions were performed at a separate and distinct time from the therapeutic activities interventions.   Rationale: decrease pain, increase ROM, and increase tissue extensibility to navigate environment with improved efficiency. With   [] TE   [] TA   [] neuro   [] other: Patient Education: [x] Review HEP    [] Progressed/Changed HEP based on:   [] positioning   [] body mechanics   [] transfers   [] heat/ice application    [] other:      Other Objective/Functional Measures: exercises per flowsheet     Pain Level (0-10 scale) post treatment: 4    ASSESSMENT/Changes in Function: Current for the electrical stimulation was switched to HiVolt. Initially, pt experienced a small muscle twitch with stimulation, but after four minutes, she felt like she was being pulled closer to a tetany, so stimulation was lowered and reset. Pt instructed in gentle stretching for remainder of estim treatment. Sizable TrPs present in the right sided scalenes, referring pain to the temple and the jaw. Discussed potential DN course of tx with the pt to reduce UT and scalene myofascial pains. Request faxed to MD today. Patient will continue to benefit from skilled PT services to modify and progress therapeutic interventions, address functional mobility deficits, address ROM deficits, address strength deficits, analyze and address soft tissue restrictions, analyze and cue movement patterns, analyze and modify body mechanics/ergonomics, assess and modify postural abnormalities, address imbalance/dizziness, and instruct in home and community integration to attain remaining goals. []  See Plan of Care  []  See progress note/recertification  []  See Discharge Summary         Progress towards goals / Updated goals:  Pt will improve her B c/s rotation to 65 deg to improve ease of turning to look behind her. PN: right = 42 degrees, left = 48 degrees   Pt will improve her c/s flexion and extension to 40 deg or better to improve ease of looking down to the floor or up to the ceiling.   PN: Flexion = 24 degrees, extension = 19 degrees  Pt will report 75% improvement in headaches to improve ease of managing ADLs. PN:every other day 25% improvement  Current: MOLINA yesterday, not today thus far (12/21/2022)  4. Pt will improve her FOTO to 60, demonstrating improved functional ADL capacity.   PN:37 1 point gain     PLAN  []  Upgrade activities as tolerated     [x]  Continue plan of care  []  Update interventions per flow sheet       []  Discharge due to:_  []  Other:_      Krishan Martin, PT 12/21/2022  9:58 AM    Future Appointments   Date Time Provider Radha Vilchis   12/21/2022 10:00 AM Zaheer Martinez, PT [facility]   12/30/2022  9:30 AM Ascencion Dior, PTA [facility]   1/4/2023 10:30 AM Mil Madrigal, PT [facility]   1/6/2023 10:00 AM Zaheer Martinez, PT [facility]   1/9/2023 10:30 AM Mil Madrigal, PT [facility]   1/12/2023 10:00 AM Mil Madrigal, PT [facility]

## 2022-12-21 NOTE — PROGRESS NOTES
Request for use of Dry Needling/Intramuscular Manual Therapy  Patient: Kym Omer     Referral Source: Son Oneill MD  Diagnosis: Cervicalgia [M54.2]      : 1975  Date of initial visit: 11/15/2022   Attended visits: 9  Missed Visits: 0    Based on findings from the physical therapy examination and evaluation, the evaluating therapist believes the patient, Kym Omer  would benefit from including Dry Needling as part of the plan of care. Dry needling is a treatment technique utilized in conjunction with other PT interventions to inactivate myofascial trigger points and the pain and dysfunction they cause. Dry Needling is an advanced procedure that requires additional training including greater than 54 hours of intensive course work. Physical Therapists at [address] [place] [address] are trained and/or certified through [employer] for their education. PROCEDURE:  Solid filament sterile needle (typically 0.3mm/30 gauge) inserted into a trigger point  Repeated movements inactivate the trigger points, taking 30-60 seconds per site  Typically consists of 1 dry needling session per week and a possible second treatment including muscle re-education, flexibility, strengthening and other manual techniques to facilitate the benefits of dry needling     BENEFITS:  Inactivation of trigger points  Decreased pain  Increased muscle length  Improved movement patterns  Restoration of function POTENTIAL RISKS:  Post-needling soreness  Infection  Bruising/bleeding  Penetration of a nerve  Pneumothorax   All treating PTs have been thoroughly educated in avoiding adverse reactions    If you agree with this recommendation, please sign this form and fax it to us at [phone]. If you have questions or concerns regarding dry needling or any other treatment we may be providing, please contact us at [phone]. Thank you for allowing us to assist in the care of your patient.   Akosua Vargas PT    12/21/2022 11:44 AM     NOTE TO PHYSICIAN:  PLEASE COMPLETE THE ORDERS BELOW AND   FAX TO [facility]: [phone]  If you are unable to process this request in 24 hours please contact our office:   [phone]    I have read the above request and AGREE to the recommendation of including dry needling as part of the plan of care.       Physicians signature: _________________________Date: _________Time:________

## 2022-12-23 ENCOUNTER — APPOINTMENT (OUTPATIENT)
Dept: PHYSICAL THERAPY | Age: 47
End: 2022-12-23
Payer: OTHER GOVERNMENT

## 2022-12-27 ENCOUNTER — APPOINTMENT (OUTPATIENT)
Dept: PHYSICAL THERAPY | Age: 47
End: 2022-12-27
Payer: OTHER GOVERNMENT

## 2022-12-30 ENCOUNTER — HOSPITAL ENCOUNTER (OUTPATIENT)
Dept: PHYSICAL THERAPY | Age: 47
End: 2022-12-30
Payer: OTHER GOVERNMENT

## 2022-12-30 PROCEDURE — 97110 THERAPEUTIC EXERCISES: CPT

## 2022-12-30 PROCEDURE — 97032 APPL MODALITY 1+ESTIM EA 15: CPT

## 2022-12-30 PROCEDURE — 97535 SELF CARE MNGMENT TRAINING: CPT

## 2022-12-30 PROCEDURE — 97112 NEUROMUSCULAR REEDUCATION: CPT

## 2022-12-30 NOTE — PROGRESS NOTES
PT DAILY TREATMENT NOTE     Patient Name: Jannie Feldman  Date:2022  : 1975  [x]  Patient  Verified  Payor: LATASHA / Plan: Stefan Holliday 74 / Product Type:  /    In time:933  Out time:1006  Total Treatment Time (min): 33  Visit #: 3 of 8        Treatment Area: Cervicalgia [M54.2]    SUBJECTIVE  Pain Level (0-10 scale): 6  Any medication changes, allergies to medications, adverse drug reactions, diagnosis change, or new procedure performed?: [x] No    [] Yes (see summary sheet for update)  Subjective functional status/changes:   [] No changes reported  Patient is interested in trying combo since there is no out of pocket expense. OBJECTIVE    Modality rationale: decrease pain and increase tissue extensibility to improve the patients ability to tolerate ADLs.     Min Type Additional Details    [] Estim:  []Unatt       []IFC  []Premod                        []Other:  []w/ice   [x]w/heat  Position: seated  Location:   7 +2' setup [x] Estim: []Att    []TENS instruct  []NMES                    [x]Other: combo []w/US   []w/ice   []w/heat  Position: seated  Location: right C/S and UT    []  Traction: [] Cervical       []Lumbar                       [] Prone          []Supine                       []Intermittent   []Continuous Lbs:  [] before manual  [] after manual    []  Ultrasound: []Continuous   [] Pulsed                           []1MHz   []3MHz W/cm2:  Location:    []  Iontophoresis with dexamethasone         Location: [] Take home patch   [] In clinic    []  Ice     []  heat  []  Ice massage  []  Laser   []  Anodyne Position:  Location:    []  Laser with stim  []  Other:  Position:  Location:    []  Vasopneumatic Device    []  Right     []  Left  Pre-treatment girth:  Post-treatment girth:  Measured at (location):  Pressure:       [] lo [] med [] hi   Temperature: [] lo [] med [] hi   [x] Skin assessment post-treatment:  [x]intact [x]redness- no adverse reaction    []redness - adverse reaction:         8 min Therapeutic Exercise:  [x] See flow sheet :   Rationale: increase ROM and increase strength to improve the patients ability to complete AdLs with ease. 8 min Neuromuscular Re-education:  [x]  See flow sheet : cervical and scapular re-education   Rationale: increase ROM, increase strength, improve coordination, improve balance, and increase proprioception  to improve the patients ability to improve biomechanics and increase kinesthetic awareness for ease of ADL completion. 8 min Self Care/Home Management: Patient education on electrical stimulation vs. Dry needling (short term benefits vs. Longer term benefits)    Rationale:  patient education   to improve the patients ability to make informed decisions regarding therapeutic interventions for improved tolerance to ADLs outside of clinic. With   [] TE   [] TA   [] neuro   [] other: Patient Education: [x] Review HEP    [] Progressed/Changed HEP based on:   [] positioning   [] body mechanics   [] transfers   [] heat/ice application    [] other:      Other Objective/Functional Measures:   -increased reps during cervical rotations   -increased time under tension during chin tucks     Pain Level (0-10 scale) post treatment: 4    ASSESSMENT/Changes in Function: After discussion with patient, it was decided to omit manual and trial combo. Patient tolerated combo well, reporting no adverse responses and stating that she preferred it to other forms of e-stim. Continue combo at next visit while awaiting approval for DN.       Patient will continue to benefit from skilled PT services to modify and progress therapeutic interventions, address functional mobility deficits, address ROM deficits, address strength deficits, analyze and address soft tissue restrictions, analyze and cue movement patterns, analyze and modify body mechanics/ergonomics, assess and modify postural abnormalities, and instruct in home and community integration to attain remaining goals. []  See Plan of Care  []  See progress note/recertification  []  See Discharge Summary         Progress towards goals / Updated goals:  Pt will improve her B c/s rotation to 65 deg to improve ease of turning to look behind her. PN: right = 42 degrees, left = 48 degrees   Pt will improve her c/s flexion and extension to 40 deg or better to improve ease of looking down to the floor or up to the ceiling. PN: Flexion = 24 degrees, extension = 19 degrees  Pt will report 75% improvement in headaches to improve ease of managing ADLs. PN:every other day 25% improvement  Current: MOLINA yesterday, not today thus far (12/21/2022)  4. Pt will improve her FOTO to 60, demonstrating improved functional ADL capacity.   PN:37 1 point gain     PLAN  [x]  Upgrade activities as tolerated     []  Continue plan of care  []  Update interventions per flow sheet       []  Discharge due to:_  []  Other:_      Rakel Ordaz, PTA 12/30/2022  6:45 AM    Future Appointments   Date Time Provider Radha Vilchis   12/30/2022  9:30 AM Corita Rubinstein, PTA [facility]   1/4/2023 10:30 AM Miah Avila, PT [facility]   1/6/2023 10:00 AM Avtar Sutherland, PT [facility]   1/9/2023 10:30 AM Miah Avila, PT [facility]   1/12/2023 10:00 AM Miah Avila, PT [facility]

## 2023-01-04 ENCOUNTER — HOSPITAL ENCOUNTER (OUTPATIENT)
Dept: PHYSICAL THERAPY | Age: 48
Discharge: HOME | End: 2023-01-04
Payer: OTHER GOVERNMENT

## 2023-01-04 PROCEDURE — 97032 APPL MODALITY 1+ESTIM EA 15: CPT

## 2023-01-04 PROCEDURE — 97110 THERAPEUTIC EXERCISES: CPT

## 2023-01-04 PROCEDURE — 97112 NEUROMUSCULAR REEDUCATION: CPT

## 2023-01-04 PROCEDURE — 97140 MANUAL THERAPY 1/> REGIONS: CPT

## 2023-01-04 NOTE — PROGRESS NOTES
PT DAILY TREATMENT NOTE 10-18    Patient Name: Jacques Cain  Date:2023  : 1975  [x]  Patient  Verified  Payor: LATASHA / Plan: Stefan Holliday 74 / Product Type: Ramirez Kearns /    In time:1030  Out time:1122  Total Treatment Time (min): 52  Visit #: 4 of 8        Treatment Area: Cervicalgia [M54.2]    SUBJECTIVE  Pain Level (0-10 scale): 6  Any medication changes, allergies to medications, adverse drug reactions, diagnosis change, or new procedure performed?: [x] No    [] Yes (see summary sheet for update)  Subjective functional status/changes:   [] No changes reported  The combo helped.     OBJECTIVE  Modality rationale: decrease pain and increase tissue extensibility to improve the patients ability to relax mm for daily activities    Min Type Additional Details   8 [x] Estim: [x]Att    []TENS instruct  []NMES                    []Other:  [x]w/US   []w/ice   []w/heat  Position:seated  Location:right [facility], [place]    []  Traction: [] Cervical       []Lumbar                       [] Prone          []Supine                       []Intermittent   []Continuous Lbs:  [] before manual  [] after manual    []  Ultrasound: []Continuous   [] Pulsed                           []1MHz   []3MHz Location:  W/cm2:    []  Iontophoresis with dexamethasone         Location: [] Take home patch   [] In clinic   10 []  Ice     [x]  heat  []  Ice massage  []  Laser   []  Anodyne Position:supine  Location:c/S    []  Laser with stim  []  Other: Position:  Location:    []  Vasopneumatic Device  Pre-treatment girth:  Post-treatment girth:  Measured at (location):  Pressure:       [] lo [] med [] hi   Temperature: [] lo [] med [] hi   [] Skin assessment post-treatment:  []intact []redness- no adverse reaction    []redness - adverse reaction:         18 min Therapeutic Exercise:  [x] See flow sheet :   Rationale: increase ROM and increase strength to improve the patients ability to perform daily activities      8 min Neuromuscular Re-education:  [x]  See flow sheet :   Rationale: increase ROM and increase strength  to improve the patients ability to perform overeha    8 min Manual Therapy:  SOR; STM right scalenes, SCMs, grade 3-4 C/S UPAs right C3C4   The manual therapy interventions were performed at a separate and distinct time from the therapeutic activities interventions. Rationale: decrease pain, increase ROM, increase tissue extensibility, and decrease trigger points to perform daily activities   With   [] TE   [] TA   [] neuro   [] other: Patient Education: [x] Review HEP    [] Progressed/Changed HEP based on:   [] positioning   [] body mechanics   [] transfers   [] heat/ice application    [] other:      Other Objective/Functional Measures:      Pain Level (0-10 scale) post treatment: 3    ASSESSMENT/Changes in Function: Multiple trigger points persist in right C/S paraspinals, SCMs , and scalenes. Continue to work on soft tissue mobilization. Patient will continue to benefit from skilled PT services to modify and progress therapeutic interventions, address functional mobility deficits, address ROM deficits, address strength deficits, analyze and address soft tissue restrictions, and analyze and cue movement patterns to attain remaining goals. []  See Plan of Care  []  See progress note/recertification  []  See Discharge Summary         Progress towards goals / Updated goals:  Pt will improve her B c/s rotation to 65 deg to improve ease of turning to look behind her. PN: right = 42 degrees, left = 48 degrees   Pt will improve her c/s flexion and extension to 40 deg or better to improve ease of looking down to the floor or up to the ceiling. PN: Flexion = 24 degrees, extension = 19 degrees  Pt will report 75% improvement in headaches to improve ease of managing ADLs. PN:every other day 25% improvement  Current: MOLINA yesterday, not today thus far (12/21/2022)  4.     Pt will improve her FOTO to 60, demonstrating improved functional ADL capacity.   PN:37 1 point gain     PLAN  [x]  Upgrade activities as tolerated     []  Continue plan of care  []  Update interventions per flow sheet       []  Discharge due to:_  []  Other:_      Kashif Vogel, HAILY, CMTPT 1/4/2023  8:36 AM    Future Appointments   Date Time Provider Radha Vilchis   1/4/2023 10:30 AM [facility]   1/6/2023 10:00 AM Dominique Jeong, PT [facility]   1/9/2023 10:30 AM Jolanta Winn, PT [facility]   1/12/2023 10:00 AM Jolanta Winn, PT [facility]

## 2023-01-06 ENCOUNTER — HOSPITAL ENCOUNTER (OUTPATIENT)
Dept: PHYSICAL THERAPY | Age: 48
Discharge: HOME | End: 2023-01-06
Payer: OTHER GOVERNMENT

## 2023-01-06 PROCEDURE — 97140 MANUAL THERAPY 1/> REGIONS: CPT

## 2023-01-06 PROCEDURE — 97112 NEUROMUSCULAR REEDUCATION: CPT

## 2023-01-06 PROCEDURE — 97014 ELECTRIC STIMULATION THERAPY: CPT

## 2023-01-06 PROCEDURE — 97110 THERAPEUTIC EXERCISES: CPT

## 2023-01-06 NOTE — PROGRESS NOTES
PT DAILY TREATMENT NOTE     Patient Name: Gina Felder  Date:2023  : 1975  [x]  Patient  Verified  Payor:  / Plan: Stefan Holliday 74 / Product Type:  /    In time:1004am  Out time:1109am  Total Treatment Time (min): 65  Visit #: 5 of 8    Treatment Area: Cervicalgia [M54.2]    SUBJECTIVE  Pain Level (0-10 scale): 5  Any medication changes, allergies to medications, adverse drug reactions, diagnosis change, or new procedure performed?: [x] No    [] Yes (see summary sheet for update)  Subjective functional status/changes:   [] No changes reported  Feels things are improving    OBJECTIVE    Modality rationale: decrease pain, increase tissue extensibility, and increase muscle contraction/control to improve the patients ability to manage ADLs.    Min Type Additional Details   12 [x] Estim:  [x]Unatt       []IFC  []Premod                        [x]Other: [employer] []w/ice   [x]w/heat  Position:supine w/ leg wedge  Location: right scalenes and UT    [] Estim: []Att    []TENS instruct  []NMES                    []Other:  []w/US   []w/ice   []w/heat  Position:  Location:    []  Traction: [] Cervical       []Lumbar                       [] Prone          []Supine                       []Intermittent   []Continuous Lbs:  [] before manual  [] after manual    []  Ultrasound: []Continuous   [] Pulsed                           []1MHz   []3MHz W/cm2:  Location:    []  Iontophoresis with dexamethasone         Location: [] Take home patch   [] In clinic    []  Ice     []  heat  []  Ice massage  []  Laser   []  Anodyne Position:  Location:    []  Laser with stim  []  Other:  Position:  Location:    []  Vasopneumatic Device    []  Right     []  Left  Pre-treatment girth:  Post-treatment girth:  Measured at (location):  Pressure:       [] lo [] med [] hi   Temperature: [] lo [] med [] hi   [] Skin assessment post-treatment:  []intact []redness- no adverse reaction    []redness - adverse reaction: 15 min Therapeutic Exercise:  [x] See flow sheet :   Rationale: increase ROM and increase strength to improve the patients ability to perform daily tasks. 26 min Neuromuscular Re-education:  [x]  See flow sheet :   Rationale: increase strength, improve coordination, and increase proprioception  to improve the patients ability to perform ADLs with reduced pain. 12 min Manual Therapy:    Supine - STM/TPR to right  SCM and scalenes, SOR    The manual therapy interventions were performed at a separate and distinct time from the therapeutic activities interventions. Rationale: decrease pain, increase ROM, and increase tissue extensibility to navigate environment with improved efficiency. With   [] TE   [] TA   [] neuro   [] other: Patient Education: [x] Review HEP    [] Progressed/Changed HEP based on:   [] positioning   [] body mechanics   [] transfers   [] heat/ice application    [] other:      Other Objective/Functional Measures: see progress towards goals     Pain Level (0-10 scale) post treatment: 3    ASSESSMENT/Changes in Function: Pt demonstrates improving c/s ROM and reports reduction in HA since her last progress report. Her overall pain level remains elevated, with sizable trigger points located at the right superior scalene and SCM and the anterolateral neck. Spent a few minutes performing TrPR to this area. Again encouraged pt to consider dry needling, but she is apprehensive about the tx. Patient will continue to benefit from skilled PT services to modify and progress therapeutic interventions, address functional mobility deficits, address ROM deficits, address strength deficits, analyze and address soft tissue restrictions, analyze and cue movement patterns, analyze and modify body mechanics/ergonomics, assess and modify postural abnormalities, and instruct in home and community integration to attain remaining goals.      []  See Plan of Care  []  See progress note/recertification  []  See Discharge Summary         Progress towards goals / Updated goals:  Pt will improve her B c/s rotation to 65 deg to improve ease of turning to look behind her. PN: right = 42 degrees, left = 48 degrees   Current: progressing, right 43 deg, left 55 deg (1/6/2023)   Pt will improve her c/s flexion and extension to 40 deg or better to improve ease of looking down to the floor or up to the ceiling. PN: Flexion = 24 degrees, extension = 19 degrees  Current: progressing, flexion 35 deg, extension 30 deg (1/6/2023)  Pt will report 75% improvement in headaches to improve ease of managing ADLs. PN:every other day 25% improvement  Current: progressing, 60% improvement with headaches (1/6/2023)  4. Pt will improve her FOTO to 60, demonstrating improved functional ADL capacity.   PN: 37, 1 point gain     PLAN  []  Upgrade activities as tolerated     [x]  Continue plan of care  []  Update interventions per flow sheet       []  Discharge due to:_  []  Other:_      Joshua Barillas, PT 1/6/2023  10:16 AM    Future Appointments   Date Time Provider Radha Vilchis   1/9/2023 10:30 AM [facility]   1/12/2023 10:00 AM [facility]

## 2023-01-09 ENCOUNTER — HOSPITAL ENCOUNTER (OUTPATIENT)
Dept: PHYSICAL THERAPY | Age: 48
Discharge: HOME | End: 2023-01-09
Payer: OTHER GOVERNMENT

## 2023-01-09 PROCEDURE — 97110 THERAPEUTIC EXERCISES: CPT

## 2023-01-09 PROCEDURE — 97032 APPL MODALITY 1+ESTIM EA 15: CPT

## 2023-01-09 PROCEDURE — 97112 NEUROMUSCULAR REEDUCATION: CPT

## 2023-01-09 PROCEDURE — 97140 MANUAL THERAPY 1/> REGIONS: CPT

## 2023-01-09 NOTE — PROGRESS NOTES
PT DAILY TREATMENT NOTE 10-18    Patient Name: Hadley Reginald  Date:2023  : 1975  [x]  Patient  Verified  Payor: LATASHA / Plan: Stefan Holliday 74 / Product Type:  /    In time:1029  Out time:1108  Total Treatment Time (min): 39  Visit #: 6 of 8    Treatment Area: Cervicalgia [M54.2]    SUBJECTIVE  Pain Level (0-10 scale): 6  Any medication changes, allergies to medications, adverse drug reactions, diagnosis change, or new procedure performed?: [x] No    [] Yes (see summary sheet for update)  Subjective functional status/changes:   [x] No changes reported      OBJECTIVE  Modality rationale: decrease pain and increase tissue extensibility to improve the patients ability to relax mm for daily activities    Min Type Additional Details   8 [x] Estim: [x]Att    []TENS instruct  []NMES                    []Other:  [x]w/US   []w/ice   []w/heat  Position:seated  Location:right UT, scalenes, C/S paraspinals   [] Skin assessment post-treatment:  []intact []redness- no adverse reaction    []redness - adverse reaction:         15 min Therapeutic Exercise:  [x] See flow sheet :   Rationale: increase ROM and increase strength to improve the patients ability to perform daily activities     8 min Neuromuscular Re-education:  [x]  See flow sheet :   Rationale: increase strength  to improve the patients ability to perform overhead activities     8 min Manual Therapy:  SOR: STM right scalenes, SCM, paraspinals C/s; grade 3-4 right UPAs    The manual therapy interventions were performed at a separate and distinct time from the therapeutic activities interventions.   Rationale: decrease pain, increase ROM, increase tissue extensibility, and decrease trigger points to relax mm for daily activities   With   [] TE   [] TA   [] neuro   [] other: Patient Education: [x] Review HEP    [] Progressed/Changed HEP based on:   [] positioning   [] body mechanics   [] transfers   [] heat/ice application    [] other: Other Objective/Functional Measures:      Pain Level (0-10 scale) post treatment: 3    ASSESSMENT/Changes in Function: Minimal carry over between visits, as patient presents again today with 6/10 C/S pain. Explained to patient that combo and manual would not get to the deeper trigger points that are attached to bone. Patient requested for therapist to send DN request to referring MD.     Patient will continue to benefit from skilled PT services to modify and progress therapeutic interventions, address functional mobility deficits, address ROM deficits, address strength deficits, analyze and address soft tissue restrictions, and analyze and cue movement patterns to attain remaining goals. []  See Plan of Care  []  See progress note/recertification  []  See Discharge Summary         Progress towards goals / Updated goals:  Pt will improve her B c/s rotation to 65 deg to improve ease of turning to look behind her. PN: right = 42 degrees, left = 48 degrees   Current: progressing, right 43 deg, left 55 deg (1/6/2023)   Pt will improve her c/s flexion and extension to 40 deg or better to improve ease of looking down to the floor or up to the ceiling. PN: Flexion = 24 degrees, extension = 19 degrees  Current: progressing, flexion 35 deg, extension 30 deg (1/6/2023)  Pt will report 75% improvement in headaches to improve ease of managing ADLs. PN:every other day 25% improvement  Current: progressing, 60% improvement with headaches (1/6/2023)  4. Pt will improve her FOTO to 60, demonstrating improved functional ADL capacity.   PN: 37, 1 point gain     PLAN  [x]  Upgrade activities as tolerated     []  Continue plan of care  []  Update interventions per flow sheet       []  Discharge due to:_  []  Other:_      Yolande Sicard, MPT, CMTPT 1/9/2023  8:38 AM    Future Appointments   Date Time Provider Radha Vilchis   1/9/2023 10:30 AM Farhana Florian PT [facility]   1/12/2023 10:00 AM Kevin Teixeira [facility]

## 2023-01-09 NOTE — PROGRESS NOTES
Request for use of Dry Needling/Intramuscular Manual Therapy  Patient: Florencio Vale     Referral Source: Daina Metcalf MD  Diagnosis: Cervicalgia [M54.2]      : 1975  Date of initial visit: 11/15/22   Attended visits: 13  Missed Visits: 0    Based on findings from the physical therapy examination and evaluation, the evaluating therapist believes the patient, Florencio Vale  would benefit from including Dry Needling as part of the plan of care. Dry needling is a treatment technique utilized in conjunction with other PT interventions to inactivate myofascial trigger points and the pain and dysfunction they cause. Dry Needling is an advanced procedure that requires additional training including greater than 54 hours of intensive course work. Physical Therapists at [address] [place] [address] are trained and/or certified through [employer] for their education. PROCEDURE:  Solid filament sterile needle (typically 0.3mm/30 gauge) inserted into a trigger point  Repeated movements inactivate the trigger points, taking 30-60 seconds per site  Typically consists of 1 dry needling session per week and a possible second treatment including muscle re-education, flexibility, strengthening and other manual techniques to facilitate the benefits of dry needling     BENEFITS:  Inactivation of trigger points  Decreased pain  Increased muscle length  Improved movement patterns  Restoration of function POTENTIAL RISKS:  Post-needling soreness  Infection  Bruising/bleeding  Penetration of a nerve  Pneumothorax   All treating PTs have been thoroughly educated in avoiding adverse reactions    If you agree with this recommendation, please sign this form and fax it to us at [phone]. If you have questions or concerns regarding dry needling or any other treatment we may be providing, please contact us at [phone]. Thank you for allowing us to assist in the care of your patient.   Sonia Lara PT    1/9/2023 11:03 AM     NOTE TO PHYSICIAN:  PLEASE COMPLETE THE ORDERS BELOW AND   FAX TO [facility]: [phone]  If you are unable to process this request in 24 hours please contact our office:   [phone]    I have read the above request and AGREE to the recommendation of including dry needling as part of the plan of care.       Physicians signature: _________________________Date: _________Time:________

## 2023-01-12 ENCOUNTER — HOSPITAL ENCOUNTER (OUTPATIENT)
Dept: PHYSICAL THERAPY | Age: 48
Discharge: HOME | End: 2023-01-12
Payer: OTHER GOVERNMENT

## 2023-01-12 PROCEDURE — 97140 MANUAL THERAPY 1/> REGIONS: CPT

## 2023-01-12 PROCEDURE — 97032 APPL MODALITY 1+ESTIM EA 15: CPT

## 2023-01-12 PROCEDURE — 97110 THERAPEUTIC EXERCISES: CPT

## 2023-01-12 PROCEDURE — 97112 NEUROMUSCULAR REEDUCATION: CPT

## 2023-01-12 NOTE — PROGRESS NOTES
PT DAILY TREATMENT NOTE 10-18    Patient Name: Melina Mullins  Date:2023  : 1975  [x]  Patient  Verified  Payor: LATASHA / Plan: Stefan Holliday 74 / Product Type: Davequline Hodgkin /    In time:1001  Out time:1056  Total Treatment Time (min): 55  Visit #: 7 of 8        Treatment Area: Cervicalgia [M54.2]    SUBJECTIVE  Pain Level (0-10 scale): 6  Any medication changes, allergies to medications, adverse drug reactions, diagnosis change, or new procedure performed?: [x] No    [] Yes (see summary sheet for update)  Subjective functional status/changes:   [x] No changes reported      OBJECTIVE  Modality rationale: decrease pain and increase tissue extensibility to improve the patients ability to relax mm for daily activities   Min Type Additional Details   8 [x] Estim: [x]Att    []TENS instruct  []NMES                    []Other:  [x]w/US   []w/ice   []w/heat  Position:seated  Location:right [place], [place]    []  Traction: [] Cervical       []Lumbar                       [] Prone          []Supine                       []Intermittent   []Continuous Lbs:  [] before manual  [] after manual    []  Ultrasound: []Continuous   [] Pulsed                           []1MHz   []3MHz Location:  W/cm2:    []  Iontophoresis with dexamethasone         Location: [] Take home patch   [] In clinic   10 []  Ice     [x]  heat  []  Ice massage  []  Laser   []  Anodyne Position:supine  Location:C/S    []  Laser with stim  []  Other: Position:  Location:    []  Vasopneumatic Device  Pre-treatment girth:  Post-treatment girth:  Measured at (location):  Pressure:       [] lo [] med [] hi   Temperature: [] lo [] med [] hi   [] Skin assessment post-treatment:  []intact []redness- no adverse reaction    []redness - adverse reaction:     21 min Therapeutic Exercise:  [x] See flow sheet :   Rationale: increase ROM and increase strength to improve the patients ability to perform daily activities      8 min Neuromuscular Re-education:  [x] See flow sheet :   Rationale: increase strength  to improve the patients ability to perform overhead activities     8 min Manual Therapy:   SOR: STM right scalenes, SCM, paraspinals C/s; grade 3-4 right UPAs    The manual therapy interventions were performed at a separate and distinct time from the therapeutic activities interventions. Rationale: decrease pain, increase ROM, and increase tissue extensibility to relax for daily activities   With   [] TE   [] TA   [] neuro   [] other: Patient Education: [x] Review HEP    [] Progressed/Changed HEP based on:   [] positioning   [] body mechanics   [] transfers   [] heat/ice application    [] other:      Other Objective/Functional Measures:      Pain Level (0-10 scale) post treatment: 3    ASSESSMENT/Changes in Function: Patient is making slow, but steady progress. She would benefit from continuing with PT with the addition of dry needling to improve soft tissue restrictions. Patient will continue to benefit from skilled PT services to modify and progress therapeutic interventions, address functional mobility deficits, address strength deficits, analyze and address soft tissue restrictions, analyze and cue movement patterns, and assess and modify postural abnormalities to attain remaining goals. []  See Plan of Care  [x]  See progress note/recertification  []  See Discharge Summary         Progress towards goals / Updated goals:  Pt will improve her B c/s rotation to 65 deg to improve ease of turning to look behind her. PN: right = 42 degrees, left = 48 degrees   Current:right 57; left 53    Pt will improve her c/s flexion and extension to 40 deg or better to improve ease of looking down to the floor or up to the ceiling. PN: Flexion = 24 degrees, extension = 19 degrees  Current: 36 flex; 40 ext- partially met  Pt will report 75% improvement in headaches to improve ease of managing ADLs.   PN:every other day 25% improvement  Current: progressing, 60% improvement with headaches  4. Pt will improve her FOTO to 60, demonstrating improved functional ADL capacity.   PN: 37, 1 point gain   Current:57 progressing       PLAN  [x]  Upgrade activities as tolerated     []  Continue plan of care  []  Update interventions per flow sheet       []  Discharge due to:_  []  Other:_      Francois Reaves, MPT, CMTPT 1/12/2023  8:46 AM    Future Appointments   Date Time Provider Radha Cerdai   1/12/2023 10:00 AM Bethann Scheuermann, PT [id number]
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
## 2023-01-25 ENCOUNTER — HOSPITAL ENCOUNTER (OUTPATIENT)
Dept: PHYSICAL THERAPY | Age: 48
Discharge: HOME | End: 2023-01-25
Payer: OTHER GOVERNMENT

## 2023-01-25 PROCEDURE — 97140 MANUAL THERAPY 1/> REGIONS: CPT

## 2023-01-25 PROCEDURE — 97112 NEUROMUSCULAR REEDUCATION: CPT

## 2023-01-25 PROCEDURE — 97110 THERAPEUTIC EXERCISES: CPT

## 2023-01-25 NOTE — PROGRESS NOTES
PT DAILY TREATMENT NOTE     Patient Name: Ihsan Sanchez  Date:2023  : 1975  [x]  Patient  Verified  Payor: LATASHA / Plan: Stefan Holliday 74 / Product Type:  /    In time:1000 am  Out time:1043 am  Total Treatment Time (min): 43  Visit #: 1 of 8    Medicare/BCBS Only   Total Timed Codes (min):  n/a 1:1 Treatment Time:  n/a       Treatment Area: Cervicalgia [M54.2]    SUBJECTIVE  Pain Level (0-10 scale): 6  Any medication changes, allergies to medications, adverse drug reactions, diagnosis change, or new procedure performed?: [x] No    [] Yes (see summary sheet for update)  Subjective functional status/changes:   [] No changes reported  Pt reports pain on right side of neck \"it doesn't let me turn\"    OBJECTIVE    Modality rationale: decrease pain and increase tissue extensibility to improve the patients ability to perform ADL's   Min Type Additional Details    [] Estim:  []Unatt       []IFC  []Premod                        []Other:  []w/ice   []w/heat  Position:   Location: Right [place]    [] Estim: []Att    []TENS instruct  []NMES                    []Other:  []w/US   []w/ice   []w/heat  Position:  Location:    []  Traction: [] Cervical       []Lumbar                       [] Prone          []Supine                       []Intermittent   []Continuous Lbs:  [] before manual  [] after manual    []  Ultrasound: []Continuous   [] Pulsed                           []1MHz   []3MHz W/cm2:  Location:    []  Iontophoresis with dexamethasone         Location: [] Take home patch   [] In clinic   10' + 3' set up []  Ice     [x]  heat  []  Ice massage  []  Laser   []  Anodyne Position: supine  Location: c/s    []  Laser with stim  []  Other:  Position:  Location:    []  Vasopneumatic Device    []  Right     []  Left  Pre-treatment girth:  Post-treatment girth:  Measured at (location):  Pressure:       [] lo [] med [] hi   Temperature: [] lo [] med [] hi   [x] Skin assessment post-treatment: [x]intact []redness- no adverse reaction    []redness - adverse reaction:       14 min Therapeutic Exercise:  [x] See flow sheet :   Rationale: increase ROM and increase strength to improve the patients ability to perform ADLs with improved shoulder/scapular strength and c/s strength and mobility. 8 min Neuromuscular Re-education:  [x]  See flow sheet :   Rationale: increase ROM, increase strength, improve coordination, improve balance and increase proprioception  to improve the patients ability to perform ADL's with improved scapular stabilization and neutral cervical posture. 8 min Manual Therapy:  in supine to right: STM/DTM to right [place], STM to right c/s paraspinals, suboccipitals    The manual therapy interventions were performed at a separate and distinct time from the therapeutic activities interventions. Rationale: decrease pain, increase tissue extensibility, and decrease trigger points to perform ADL's with greater ease            With   [] TE   [] TA   [] neuro   [] other: Patient Education: [x] Review HEP    [] Progressed/Changed HEP based on:   [] positioning   [] body mechanics   [] transfers   [] heat/ice application    [] other:      Other Objective/Functional Measures:   Initiated QP horizontal abduction      Progressed repetitions (see flow sheet)    Pain Level (0-10 scale) post treatment: 4    ASSESSMENT/Changes in Function: Pt tolerated added and progressed interventions well. TTP noted along right [place] and cervical paraspinals with soft tissue restrictions noted. Will plan to continue to benefit from skilled OP PT services to address soft tissue restrictions, promote improved c/s mobility for ease with ADL's.      Patient will continue to benefit from skilled PT services to modify and progress therapeutic interventions, address functional mobility deficits, address ROM deficits, address strength deficits, analyze and address soft tissue restrictions, analyze and cue movement patterns, analyze and modify body mechanics/ergonomics, assess and modify postural abnormalities, address imbalance/dizziness and instruct in home and community integration to attain remaining goals and improve overall function. []  See Plan of Care  []  See progress note/recertification  []  See Discharge Summary         Progress towards goals / Updated goals:  Updated Goals: to be achieved in 4 weeks:  Pt will improve her B c/s rotation to 65 deg to improve ease of turning to look behind her. PN:right 57; left 53    Current: She reports continued limited rotation with ADL's (1/25/23)   Pt will improve her c/s flexion and extension to 40 deg or better to improve ease of looking down to the floor or up to the ceiling. PN: 36 flex; 40 ext- partially met  Current:   Pt will report 75% improvement in headaches to improve ease of managing ADLs. PN: progressing, 60% improvement with headaches  Current:   4. Pt will improve her FOTO to 60, demonstrating improved functional ADL capacity. PN:57 progressing  Current:     PLAN  [x]  Upgrade activities as tolerated     [x]  Continue plan of care  []  Update interventions per flow sheet       []  Discharge due to:_  []  Other:_      Miah Navarro, PT 1/25/2023  2:33 PM     No future appointments.

## (undated) DEVICE — SOL IRR STRL H2O 500ML STRL --

## (undated) DEVICE — TUBING INSUFFLATION CAP W/ EXT CARBON DIOX ENDO SMARTCAP

## (undated) DEVICE — SOL IRR STRL H2O 1000ML BTL --

## (undated) DEVICE — TUBING, SUCTION, 9/32" X 10', STRAIGHT: Brand: MEDLINE

## (undated) DEVICE — Device: Brand: DEFENDO VALVE AND CONNECTOR KIT

## (undated) DEVICE — KIT COLON W/ 1.1OZ LUB AND 2 END

## (undated) DEVICE — ENDOGATOR TUBING FOR BOSTON SCIENTIFIC ENDOSTAT II PUMP, OLYMPUS OFP PUMP OR ENDO STRATUS PUMP: Brand: ENDOGATOR

## (undated) DEVICE — GOWN,AURORA,FABRIC-REINFORCED,X-LARGE: Brand: MEDLINE